# Patient Record
(demographics unavailable — no encounter records)

---

## 2025-04-17 NOTE — PROCEDURE
[FreeTextEntry3] :  Injection Procedure Note:  The risks, benefits, and alternatives to corticosteroid injection were reviewed with the patient.  Risks outlined include but are not limited to infection, sepsis, bleeding, scarring, skin discoloration, temporary increase in pain, syncopal episode, failure to resolve symptoms, symptoms recurrence, allergic reaction, flare reaction, and elevation of blood sugar in diabetics.  Patient understood the risks and asked to proceed with this treatment course.  Patient Identification Name/: Verbal with patient and/or family  Procedure Verification: Procedure confirmed with patient or family/designee Consent for procedure: Verbal Consent Given Relevant documentation completed, reviewed, and signed Clinical indications for procedure confirmed  Time-out with all members of procedure team immediately prior to procedure: Correct patient identified. Agreement on procedure. Correct side and site.  KNEE INJECTION (STEROID) - LEFT After verbal consent and identification of the correct patient and correct site, the superolateral left knee was prepped using alcohol swabs and betadine. This was allowed time to air dry. A mixture of 1cc DepoMedrol 40mg/ml, 3cc Lidocaine 1%, and 3cc Bupivacaine 0.5% was injected into the suprapatellar pouch using a sterile 22G needle after ethyl chloride spray for skin anesthesia. The patient tolerated the procedure well. After-care instructions were provided and included instructions to ice the area and to call if redness, pain, or fever develop. No risk alerts present No risk alerts present

## 2025-04-17 NOTE — ASSESSMENT
[FreeTextEntry1] :    Left X-Ray Examination of the KNEE (4 views): there are no fractures, subluxations or dislocations. some deg changes  exacerbation of underlying mild arthritis along with MMT   acute complicated injury with knee internal derangement likely meniscus tear requiring surgery   Due to the patients mechanical symptoms along with medial joint line pain, effusion, and pos ramon test on exam we will get an mri to eval for medial meniscus tear   - We discussed their diagnosis and treatment options at length including the risks and benefits of both surgical and non-surgical options. Surgical risks include but are not limited to pain, infection, bleeding, vascular injury, numbness, tingling, nerve damage. - The patient was advised to apply ice (wrapped in a towel or protective covering) to the area daily (20 minutes at a time, 2-4X/day). - We also discussed the possible of a corticosteroid injection in order to help decrease inflammation and pain so that they can perform better therapy and they wished to proceed with this treatment course. - The patient was advised to modify their activities. - f/u after mri to further discuss surgery    Medication Discussion: 1) We discussed a comprehensive treatment plan that included possible pharmaceutical management involving the use of prescription strength medications including but not limited to options such as oral Naprosyn 500mg BID, once daily Meloxicam 15 mg, or 500-650 mg Tylenol versus over the counter oral medications in addition to discussing possible topical prescription Pennsaid vs  Voltaren gel. 2) There is a moderate risk of morbidity with further treatment, especially from use of prescription strength medications and possible side effects of these medications which include but are not limited to upset stomach with oral medications, skin reactions to topical medications and GI/cardiac/renal issues with long term use. 3) I recommended that the patient follow-up with their medical physician if there are any significant potential issues with long term medication use such as interactions with current medications or with exacerbation of underlying medical comorbidities. 4) The benefits and risks associated with use of oral and / or topical prescription and over the counter anti-inflammatory medications were discussed with the patient. The patient voiced understanding of the risks including but not limited to bleeding, stroke, kidney dysfunction, heart disease, and were referred to the black box warning label for further information.

## 2025-04-17 NOTE — IMAGING
[de-identified] :  LEFT KNEE Inspection:  mild effusion Palpation: medial joint line tenderness, anterior tenderness Knee Range of Motion:  3-125  Strength: 5/5 Quadriceps strength, 5/5 Hamstring strength Neurological: light touch is intact throughout Ligament Stability and Special Tests:  McMurrays: pos Lachman: neg Pivot Shift: neg Posterior Drawer: neg Valgus: neg Varus: neg Patella Apprehension: neg Patella Maltracking: neg

## 2025-04-17 NOTE — HISTORY OF PRESENT ILLNESS
[de-identified] : 55 year old male  (work from home )  left knee pain when 1/2025 when squatting and twisted. Pain has since worsened since 4/17/25 when walking over a mile and felt knee buckle The pain is located  anterior, medisl The pain is associated with  buckling, catching, swelling, Worse with activity and better at rest. Has tried brace, advil, ice, activity mod

## 2025-04-30 NOTE — DATA REVIEWED
[MRI] : MRI [Left] : left [Knee] : knee [I independently reviewed and interpreted images and report] : I independently reviewed and interpreted images and report home

## 2025-05-01 NOTE — HISTORY OF PRESENT ILLNESS
[de-identified] : 55 year old male  (work from home )  left knee pain when 1/2025 when squatting and twisted. Pain has since worsened since 4/17/25 when walking over a mile and felt knee buckle The pain is located  anterior, medial The pain is associated with  buckling, catching, swelling, Worse with activity and better at rest. Has tried brace, advil, ice, activity mod  5/1/25 - had mri, cont pain medial along with catching and now locking in knee

## 2025-05-01 NOTE — ASSESSMENT
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 Surgical risks include but are not limited to pain, infection, bleeding, vascular injury, numbness, tingling, nerve damage. - Given their active lifestyle along with pain displaced large meniscus flap , locking and mechanical symptoms that will not improve with therapy they are a surgical candidate. - The risks, benefits, and alternatives to left  knee PMM, synov were discussed with the patient, all questions were answered.

## 2025-05-01 NOTE — HISTORY OF PRESENT ILLNESS
[de-identified] : 55 year old male  (work from home )  left knee pain when 1/2025 when squatting and twisted. Pain has since worsened since 4/17/25 when walking over a mile and felt knee buckle The pain is located  anterior, medial The pain is associated with  buckling, catching, swelling, Worse with activity and better at rest. Has tried brace, advil, ice, activity mod  5/1/25 - had mri, cont pain medial along with catching and now locking in knee

## 2025-05-01 NOTE — IMAGING
[de-identified] : LEFT KNEE Inspection:  mild effusion Palpation: medial joint line tenderness, anterior tenderness Knee Range of Motion:  7-110  Strength: 5/5 Quadriceps strength, 5/5 Hamstring strength Neurological: light touch is intact throughout Ligament Stability and Special Tests:  McMurrays: pos Lachman: neg Pivot Shift: neg Posterior Drawer: neg Valgus: neg Varus: neg Patella Apprehension: neg Patella Maltracking: neg

## 2025-05-01 NOTE — DISCUSSION/SUMMARY
[de-identified] : The patient was advised of the diagnosis.  The natural history of the pathology was explained in full to the patient in layman's terms.  Several different treatment options were discussed and explained to the patient including the risks and benefits of both surgical and non-surgical treatments.  The risks, benefits, and alternatives to surgical arthroscopy of the left knee with partial medial meniscectomy and synovectomy were reviewed with the patient.  Specifically, I reviewed with the patient that any anterior knee pain may paradoxically worsen for the first six weeks following arthroscopy due to quadriceps weakness. Further, I reviewed with the patient that while arthroscopic treatment typically provides substantial relief of the symptoms (posteromedial or posterolateral joint line pain and mechanical symptoms) related to meniscus tears, arthroscopic treatments typically have very minimal relief of symptoms (anterior knee pain) related to chondromalacia patella or early osteoarthritis.  The risk of recurrent tears as well as progression of occult or underlying arthritis, avascular necrosis, and / or chondrolysis were discussed as well. The patient clearly communicated that these issues were understood.    We also discussed that the risks of surgery include but are not limited to pain, infection (superficial or deep), bleeding, vascular injury, numbness, tingling, nerve damage (direct or indirect), scarring, wound breakdown, failure to resolve symptoms, symptom recurrence, the need for further surgery as well as medical complications such as blood clots, pulmonary embolism, heart attack, stroke, and other anesthesia complications including even death.  The patient clearly communicated that these risks were understood and wished to proceed. This will be scheduled accordingly.

## 2025-05-01 NOTE — IMAGING
[de-identified] : LEFT KNEE Inspection:  mild effusion Palpation: medial joint line tenderness, anterior tenderness Knee Range of Motion:  7-110  Strength: 5/5 Quadriceps strength, 5/5 Hamstring strength Neurological: light touch is intact throughout Ligament Stability and Special Tests:  McMurrays: pos Lachman: neg Pivot Shift: neg Posterior Drawer: neg Valgus: neg Varus: neg Patella Apprehension: neg Patella Maltracking: neg

## 2025-05-01 NOTE — DISCUSSION/SUMMARY
[de-identified] : The patient was advised of the diagnosis.  The natural history of the pathology was explained in full to the patient in layman's terms.  Several different treatment options were discussed and explained to the patient including the risks and benefits of both surgical and non-surgical treatments.  The risks, benefits, and alternatives to surgical arthroscopy of the left knee with partial medial meniscectomy and synovectomy were reviewed with the patient.  Specifically, I reviewed with the patient that any anterior knee pain may paradoxically worsen for the first six weeks following arthroscopy due to quadriceps weakness. Further, I reviewed with the patient that while arthroscopic treatment typically provides substantial relief of the symptoms (posteromedial or posterolateral joint line pain and mechanical symptoms) related to meniscus tears, arthroscopic treatments typically have very minimal relief of symptoms (anterior knee pain) related to chondromalacia patella or early osteoarthritis.  The risk of recurrent tears as well as progression of occult or underlying arthritis, avascular necrosis, and / or chondrolysis were discussed as well. The patient clearly communicated that these issues were understood.    We also discussed that the risks of surgery include but are not limited to pain, infection (superficial or deep), bleeding, vascular injury, numbness, tingling, nerve damage (direct or indirect), scarring, wound breakdown, failure to resolve symptoms, symptom recurrence, the need for further surgery as well as medical complications such as blood clots, pulmonary embolism, heart attack, stroke, and other anesthesia complications including even death.  The patient clearly communicated that these risks were understood and wished to proceed. This will be scheduled accordingly.

## 2025-05-29 NOTE — HISTORY OF PRESENT ILLNESS
[de-identified] : 55 year old male  (work from home )  left knee pain when 1/2025 when squatting and twisted. Pain has since worsened since 4/17/25 when walking over a mile and felt knee buckle The pain is located  anterior, medial The pain is associated with  buckling, catching, swelling, Worse with activity and better at rest. Has tried brace, advil, ice, activity mod  5/1/25 - had mri, cont pain medial along with catching and now locking in knee  ** s/p L knee PMM, synov on 5/23/25 ** ( some arthritis)  5/29/25 - po visit, starting PT at  in [facility]

## 2025-05-29 NOTE — IMAGING
[de-identified] :  LEFT KNEE Inspection:  mild effusion, incisions c/d/i Palpation: medial facet ttp Knee Range of Motion:  0-120 Strength: 4/5 Quadriceps strength, 5/5 Hamstring strength Neurological: light touch is intact throughout Ligament Stability and Special Tests:  McMurrays: neg Lachman: neg Pivot Shift: neg Posterior Drawer: neg Valgus: neg Varus: neg Patella Apprehension: neg Patella Maltracking: neg

## 2025-07-24 NOTE — HISTORY OF PRESENT ILLNESS
[de-identified] : 55 year old male  (work from home )  left knee pain when 1/2025 when squatting and twisted. Pain has since worsened since 4/17/25 when walking over a mile and felt knee buckle The pain is located  anterior, medial The pain is associated with  buckling, catching, swelling, Worse with activity and better at rest. Has tried brace, advil, ice, activity mod  5/1/25 - had mri, cont pain medial along with catching and now locking in knee  ** s/p L knee PMM, synov on 5/23/25 ** ( some arthritis)  5/29/25 - po visit, starting PT at Prof in [place] with Rolf 7/24/25 - cont with PT and HEP, improving

## 2025-07-24 NOTE — IMAGING
[de-identified] :  LEFT KNEE Inspection:  well healed surg scars, mild effusion Palpation: no ttp Knee Range of Motion:  0-135 Strength: 5-/5 Quadriceps strength, 5/5 Hamstring strength Neurological: light touch is intact throughout Ligament Stability and Special Tests:  McMurrays: neg Lachman: neg Pivot Shift: neg Posterior Drawer: neg Valgus: neg Varus: neg Patella Apprehension: neg Patella Maltracking: neg

## 2025-07-24 NOTE — HISTORY OF PRESENT ILLNESS
[de-identified] : 55 year old male  (work from home )  left knee pain when 1/2025 when squatting and twisted. Pain has since worsened since 4/17/25 when walking over a mile and felt knee buckle The pain is located  anterior, medial The pain is associated with  buckling, catching, swelling, Worse with activity and better at rest. Has tried brace, advil, ice, activity mod  5/1/25 - had mri, cont pain medial along with catching and now locking in knee  ** s/p L knee PMM, synov on 5/23/25 ** ( some arthritis)  5/29/25 - po visit, starting PT at Prof in [place] with Rolf 7/24/25 - cont with PT and HEP, improving

## 2025-07-24 NOTE — ASSESSMENT
[FreeTextEntry1] : ** s/p L knee PMM, synov on 5/23/25 **  ( some arthritis)  - PT on post op protocol - The patient was provided with a prescription for Physical Therapy  - Home exercises program learned at physical therapy. - The patient was advised to apply ice (wrapped in a towel or protective covering) to the area daily (20 minutes at a time, 2-4X/day). - fu 8 week re-eval if arthritis cont to act up consider injections

## 2025-07-24 NOTE — IMAGING
[de-identified] :  LEFT KNEE Inspection:  well healed surg scars, mild effusion Palpation: no ttp Knee Range of Motion:  0-135 Strength: 5-/5 Quadriceps strength, 5/5 Hamstring strength Neurological: light touch is intact throughout Ligament Stability and Special Tests:  McMurrays: neg Lachman: neg Pivot Shift: neg Posterior Drawer: neg Valgus: neg Varus: neg Patella Apprehension: neg Patella Maltracking: neg